# Patient Record
Sex: MALE | Race: WHITE | Employment: OTHER | URBAN - NONMETROPOLITAN AREA
[De-identification: names, ages, dates, MRNs, and addresses within clinical notes are randomized per-mention and may not be internally consistent; named-entity substitution may affect disease eponyms.]

---

## 2022-01-11 ENCOUNTER — DOCUMENTATION (OUTPATIENT)
Dept: NEUROLOGY | Facility: CLINIC | Age: 52
End: 2022-01-11

## 2022-01-11 ENCOUNTER — OFFICE VISIT (OUTPATIENT)
Dept: NEUROLOGY | Facility: CLINIC | Age: 52
End: 2022-01-11

## 2022-01-11 VITALS
DIASTOLIC BLOOD PRESSURE: 76 MMHG | HEART RATE: 100 BPM | SYSTOLIC BLOOD PRESSURE: 124 MMHG | BODY MASS INDEX: 32.95 KG/M2 | WEIGHT: 265 LBS | OXYGEN SATURATION: 97 % | HEIGHT: 75 IN

## 2022-01-11 DIAGNOSIS — R25.3 BENIGN FASCICULATION-CRAMP SYNDROME: Primary | ICD-10-CM

## 2022-01-11 PROCEDURE — 99204 OFFICE O/P NEW MOD 45 MIN: CPT | Performed by: PSYCHIATRY & NEUROLOGY

## 2022-01-11 RX ORDER — PRAZOSIN HYDROCHLORIDE 2 MG/1
2 CAPSULE ORAL NIGHTLY
COMMUNITY

## 2022-01-11 RX ORDER — CYCLOBENZAPRINE HCL 10 MG
10 TABLET ORAL 3 TIMES DAILY PRN
Qty: 90 TABLET | Refills: 1 | Status: SHIPPED | OUTPATIENT
Start: 2022-01-11 | End: 2022-03-29 | Stop reason: SDUPTHER

## 2022-01-11 RX ORDER — MELOXICAM 15 MG/1
15 TABLET ORAL DAILY
COMMUNITY

## 2022-01-11 RX ORDER — CLONAZEPAM 1 MG/1
1 TABLET ORAL NIGHTLY PRN
COMMUNITY
End: 2022-01-11 | Stop reason: SDUPTHER

## 2022-01-11 RX ORDER — CHOLECALCIFEROL (VITAMIN D3) 125 MCG
5 CAPSULE ORAL NIGHTLY PRN
COMMUNITY

## 2022-01-11 RX ORDER — LIDOCAINE 50 MG/G
1 PATCH TOPICAL EVERY 24 HOURS
COMMUNITY

## 2022-01-11 RX ORDER — TOPIRAMATE 50 MG/1
50 TABLET, FILM COATED ORAL 2 TIMES DAILY
COMMUNITY
End: 2022-01-11 | Stop reason: SDUPTHER

## 2022-01-11 RX ORDER — CYCLOBENZAPRINE HCL 10 MG
10 TABLET ORAL 3 TIMES DAILY PRN
COMMUNITY
End: 2022-01-11 | Stop reason: SDUPTHER

## 2022-01-11 RX ORDER — DULOXETIN HYDROCHLORIDE 60 MG/1
60 CAPSULE, DELAYED RELEASE ORAL DAILY
COMMUNITY

## 2022-01-11 RX ORDER — CLONAZEPAM 1 MG/1
1 TABLET ORAL NIGHTLY PRN
Qty: 30 TABLET | Refills: 1 | Status: SHIPPED | OUTPATIENT
Start: 2022-01-11 | End: 2022-03-29 | Stop reason: SDUPTHER

## 2022-01-11 RX ORDER — TRAZODONE HYDROCHLORIDE 100 MG/1
100 TABLET ORAL NIGHTLY
COMMUNITY

## 2022-01-11 RX ORDER — TOPIRAMATE 50 MG/1
50 TABLET, FILM COATED ORAL 2 TIMES DAILY
Qty: 60 TABLET | Refills: 5 | Status: SHIPPED | OUTPATIENT
Start: 2022-01-11 | End: 2022-08-30 | Stop reason: SDUPTHER

## 2022-01-11 NOTE — PROGRESS NOTES
Chief Complaint  Neurologic Problem (pt complains of painful muscle jerking/spasms and muscle cramps. pt recently moved her from [place]. pt also complains of RLS. 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  Patient recently moved from [place] and needs to establish care.  He was seeing a neurologist Dr. Green at the [facility] (formerly [facility]3].  He says he has been diagnosed with benign fasciculation syndrome and had been treated with topiramate, clonazepam, cyclobenzaprine.  He was also getting medical marijuana.  He states this all started after [place].  This caused problems subsequently with his hip and knee on the right.  He was in a lot of pain.  He had previously been on clonazepam and when taken off, he felt like he had no control over his legs likely felt restless.  He describes them as feeling hollow and like they would balance.  He subsequently was having spasms or jerks of his body.  He started seeing Dr. Green who prescribed these medications and significantly decreased these spasms.  I am not sure if he ever had an EMG or what work-up he had with Dr. Green.    Still has chronic pain and has been taken off pain medications.  He was previously seeing an interventional pain doctor who had done injections.        Past Medical History:   Diagnosis Date   • Fatty liver    • PTSD (post-traumatic stress disorder)           Current Outpatient Medications:   •  clonazePAM (KlonoPIN) 1 MG tablet, Take 1 tablet by mouth At Night As Needed for Anxiety., Disp: 30 tablet, Rfl: 1  •  cyclobenzaprine (FLEXERIL) 10 MG tablet, Take 1 tablet by mouth 3 (Three) Times a Day As Needed for Muscle Spasms., Disp: 90 tablet, Rfl: 1  •  DULoxetine (CYMBALTA) 60 MG capsule, Take 60 mg by mouth Daily., Disp: , Rfl:   •  lidocaine (LIDODERM) 5 %,  "Place 1 patch on the skin as directed by provider Daily. Remove & Discard patch within 12 hours or as directed by MD, Disp: , Rfl:   •  magnesium oxide (MAGOX) 400 (241.3 Mg) MG tablet tablet, Take 400 mg by mouth 2 (Two) Times a Day., Disp: , Rfl:   •  melatonin 5 MG tablet tablet, Take 5 mg by mouth At Night As Needed., Disp: , Rfl:   •  meloxicam (MOBIC) 15 MG tablet, Take 15 mg by mouth Daily., Disp: , Rfl:   •  prazosin (MINIPRESS) 2 MG capsule, Take 2 mg by mouth Every Night., Disp: , Rfl:   •  topiramate (TOPAMAX) 50 MG tablet, Take 1 tablet by mouth 2 (Two) Times a Day., Disp: 60 tablet, Rfl: 5  •  traZODone (DESYREL) 100 MG tablet, Take 100 mg by mouth Every Night., Disp: , Rfl:        Objective   Vital Signs:   /76 (BP Location: Left arm, Patient Position: Sitting, Cuff Size: Large Adult)   Pulse 100   Ht 190.5 cm (75\")   Wt 120 kg (265 lb)   SpO2 97%   BMI 33.12 kg/m²     Physical Exam  Constitutional:       General: He is awake.   Eyes:      Extraocular Movements: Extraocular movements intact.      Pupils: Pupils are equal, round, and reactive to light.   Neurological:      Mental Status: He is alert.      Deep Tendon Reflexes: Strength normal and reflexes are normal and symmetric.   Psychiatric:         Speech: Speech normal.        Neurological Exam  Mental Status  Awake and alert. Oriented to person, place and time. Recent and remote memory are intact. Speech is normal. Language is fluent with no aphasia. Attention and concentration are normal. Fund of knowledge is appropriate for level of education.    Cranial Nerves  CN II: Visual fields full to confrontation.  CN III, IV, VI: Extraocular movements intact bilaterally. Pupils equal round and reactive to light bilaterally.  CN V: Facial sensation is normal.  CN VII: Full and symmetric facial movement.  CN IX, X: Palate elevates symmetrically  CN XI: Shoulder shrug strength is normal.  CN XII: Tongue midline without atrophy or " fasciculations.    Motor  Normal muscle bulk throughout. Normal muscle tone. No abnormal involuntary movements. Strength is 5/5 throughout all four extremities.    Sensory  Light touch is normal in upper and lower extremities.     Reflexes  Deep tendon reflexes are 2+ and symmetric in all four extremities with downgoing toes bilaterally.    Coordination  Right: Finger-to-nose normal.    Gait  Casual gait is normal including stance, stride, and arm swing.      Result Review :                     Assessment and Plan   51-year-old male with reported benign cramp fasciculation syndrome.  He notes and treated by Dr. Green in [place].  He states he was doing well on topiramate 50 mg twice daily, clonazepam 1 mg nightly, and Flexeril 10 mg 3 times daily.  His neurologic exam is normal today.  I will get the records from his previous neurologist and will refill his medications at least until I can confirm.    Plan:    1.  Continue medications as above.  2.  Get records from neurologist in [place].  3.  Had patient's sign a controlled substance contract today.        Follow Up   Return in about 6 months (around 7/11/2022) for Next scheduled follow up.  Patient was given instructions and counseling regarding his condition or for health maintenance advice. Please see specific information pulled into the AVS if appropriate.

## 2022-01-11 NOTE — PROGRESS NOTES
When I created the referral I entered it into the wrong chart. It should have been entered into the Father's chart. I have entered for a chart correction and entered a SAFE report. The patient was seen after a correct chart was created.

## 2022-03-29 DIAGNOSIS — R25.3 BENIGN FASCICULATION-CRAMP SYNDROME: ICD-10-CM

## 2022-03-29 RX ORDER — CYCLOBENZAPRINE HCL 10 MG
10 TABLET ORAL 3 TIMES DAILY PRN
Qty: 90 TABLET | Refills: 1 | Status: SHIPPED | OUTPATIENT
Start: 2022-03-29 | End: 2022-06-01

## 2022-03-29 RX ORDER — CLONAZEPAM 1 MG/1
1 TABLET ORAL NIGHTLY PRN
Qty: 30 TABLET | Refills: 1 | Status: SHIPPED | OUTPATIENT
Start: 2022-03-29 | End: 2022-06-17 | Stop reason: SDUPTHER

## 2022-03-29 NOTE — TELEPHONE ENCOUNTER
PT CALLED BACK AND REMEMBERED HE ALSO NEEDED HIS    CYCLOBENZAPRINE  10MG ADDED TO THAT RX ORDER     [facility]  [phone]

## 2022-03-29 NOTE — TELEPHONE ENCOUNTER
Caller: Priti Perfecto    Relationship: Self    Best call back number: [phone]    Requested Prescriptions:   Requested Prescriptions     Pending Prescriptions Disp Refills   • clonazePAM (KlonoPIN) 1 MG tablet 30 tablet 1     Sig: Take 1 tablet by mouth At Night As Needed for Anxiety.        [facility] sent[facility]     Additional details provided by patient: PT STATES HE HAS 7 DOSES LEFT    Does the patient have less than a 3 day supply:  [] Yes  [x] No    Lionel Davila Rep   03/29/22 11:23 CDT

## 2022-06-01 DIAGNOSIS — R25.3 BENIGN FASCICULATION-CRAMP SYNDROME: ICD-10-CM

## 2022-06-01 RX ORDER — CYCLOBENZAPRINE HCL 10 MG
TABLET ORAL
Qty: 90 TABLET | Refills: 1 | Status: SHIPPED | OUTPATIENT
Start: 2022-06-01 | End: 2022-08-30 | Stop reason: SDUPTHER

## 2022-06-16 NOTE — TELEPHONE ENCOUNTER
[facility] STATES [facility]? CAN WE FAX ANOTHER  SCRIPT OVER.     PLEASE ADVISE.     [phone] FOLLOW PROMP TO PHARMACY .

## 2022-06-17 DIAGNOSIS — R25.3 BENIGN FASCICULATION-CRAMP SYNDROME: ICD-10-CM

## 2022-06-17 RX ORDER — CLONAZEPAM 1 MG/1
1 TABLET ORAL NIGHTLY PRN
Qty: 30 TABLET | Refills: 1 | Status: SHIPPED | OUTPATIENT
Start: 2022-06-17 | End: 2022-07-18 | Stop reason: SDUPTHER

## 2022-06-17 NOTE — TELEPHONE ENCOUNTER
Contacted Perfecto yesterday, he requested Klonopin refill.  Explained that Dr. Perry was out of the office yesterday.  Notified she sent a script to his pharmacy today.

## 2022-07-18 DIAGNOSIS — R25.3 BENIGN FASCICULATION-CRAMP SYNDROME: ICD-10-CM

## 2022-07-18 RX ORDER — CLONAZEPAM 1 MG/1
1 TABLET ORAL NIGHTLY PRN
Qty: 30 TABLET | Refills: 1 | Status: SHIPPED | OUTPATIENT
Start: 2022-07-18 | End: 2022-08-30 | Stop reason: SDUPTHER

## 2022-07-18 NOTE — TELEPHONE ENCOUNTER
Caller: Perfecto Marcos    Relationship: Self    Best call back number: [phone]    Requested Prescriptions:   Requested Prescriptions     Pending Prescriptions Disp Refills   • clonazePAM (KlonoPIN) 1 MG tablet 30 tablet 1     Sig: Take 1 tablet by mouth At Night As Needed for Anxiety.      [facility]    Additional details provided by patient:   PT ONLY 5 DAYS LEFT. PT STATED LAST MONTH THIS RX WAS FILLED 21 DAYS LATE. PT IS WANTING TO AVOID A REPEAT OF THIS.  THE OTHER RXs PRESCRIBED BY DR MABRY CAME AS EXPECTED. IT WAS ONLY THE CLONAZEPAM THAT WAS LATE.    Does the patient have less than a 3 day supply:  [] Yes  [x] No    Lionel Fleming Rep   07/18/22 11:12 CDT

## 2022-08-30 ENCOUNTER — OFFICE VISIT (OUTPATIENT)
Dept: NEUROLOGY | Facility: CLINIC | Age: 52
End: 2022-08-30

## 2022-08-30 VITALS
SYSTOLIC BLOOD PRESSURE: 102 MMHG | OXYGEN SATURATION: 97 % | HEIGHT: 75 IN | HEART RATE: 82 BPM | DIASTOLIC BLOOD PRESSURE: 70 MMHG | WEIGHT: 248.6 LBS | BODY MASS INDEX: 30.91 KG/M2

## 2022-08-30 DIAGNOSIS — R25.3 BENIGN FASCICULATION-CRAMP SYNDROME: ICD-10-CM

## 2022-08-30 PROCEDURE — 99214 OFFICE O/P EST MOD 30 MIN: CPT | Performed by: PSYCHIATRY & NEUROLOGY

## 2022-08-30 RX ORDER — TOPIRAMATE 50 MG/1
50 TABLET, FILM COATED ORAL 2 TIMES DAILY
Qty: 60 TABLET | Refills: 5 | Status: SHIPPED | OUTPATIENT
Start: 2022-08-30 | End: 2023-02-06 | Stop reason: SDUPTHER

## 2022-08-30 RX ORDER — CYCLOBENZAPRINE HCL 10 MG
10 TABLET ORAL 3 TIMES DAILY PRN
Qty: 90 TABLET | Refills: 5 | Status: SHIPPED | OUTPATIENT
Start: 2022-08-30

## 2022-08-30 RX ORDER — CLONAZEPAM 1 MG/1
1 TABLET ORAL NIGHTLY PRN
Qty: 30 TABLET | Refills: 5 | Status: SHIPPED | OUTPATIENT
Start: 2022-08-30 | End: 2023-03-07 | Stop reason: SDUPTHER

## 2023-02-06 DIAGNOSIS — R25.3 BENIGN FASCICULATION-CRAMP SYNDROME: ICD-10-CM

## 2023-02-06 RX ORDER — CLONAZEPAM 1 MG/1
1 TABLET ORAL NIGHTLY PRN
Qty: 30 TABLET | Refills: 5 | Status: CANCELLED | OUTPATIENT
Start: 2023-02-06 | End: 2023-08-05

## 2023-02-06 NOTE — TELEPHONE ENCOUNTER
Caller: Perfecto Marcos    Relationship: Self    Best call back number: [phone]      Requested Prescriptions:   Requested Prescriptions     Pending Prescriptions Disp Refills   • clonazePAM (KlonoPIN) 1 MG tablet 30 tablet 5     Sig: Take 1 tablet by mouth At Night As Needed for Anxiety for up to 180 days.   • topiramate (TOPAMAX) 50 MG tablet 60 tablet 5     Sig: Take 1 tablet by mouth 2 (Two) Times a Day.        Pharmacy where request should be sent: [facility] - [place], [place] - [address] [place] [address] - [phone] [facility] [phone] FX     Additional details provided by patient: PATIENT STATES THAT HE HAS A WEEK'S WORTH LEFT BUT IT IS A MAIL ORDER PHARMACY SO IT NEEDS TO BE SUBMITTED RIGHT AWAY.    Does the patient have less than a 3 day supply:  [x] Yes  [] No    Would you like a call back once the refill request has been completed: [x] Yes [] No    If the office needs to give you a call back, can they leave a voicemail: [x] Yes [] No    Lionel Herman Rep   02/06/23 14:32 CST

## 2023-02-06 NOTE — TELEPHONE ENCOUNTER
Explained that [facility] and the pharmacy said the script was filled on 1-11 and 1-26.  He found the package that was sent on 1-26 and apologized, he said they have had the flu.

## 2023-02-07 RX ORDER — TOPIRAMATE 50 MG/1
50 TABLET, FILM COATED ORAL 2 TIMES DAILY
Qty: 60 TABLET | Refills: 0 | Status: SHIPPED | OUTPATIENT
Start: 2023-02-07

## 2023-02-28 ENCOUNTER — OFFICE VISIT (OUTPATIENT)
Dept: NEUROLOGY | Facility: CLINIC | Age: 53
End: 2023-02-28
Payer: OTHER GOVERNMENT

## 2023-02-28 VITALS
BODY MASS INDEX: 30.46 KG/M2 | SYSTOLIC BLOOD PRESSURE: 122 MMHG | HEIGHT: 75 IN | DIASTOLIC BLOOD PRESSURE: 84 MMHG | HEART RATE: 85 BPM | WEIGHT: 245 LBS | OXYGEN SATURATION: 98 %

## 2023-02-28 DIAGNOSIS — R25.3 BENIGN FASCICULATION-CRAMP SYNDROME: Primary | ICD-10-CM

## 2023-02-28 PROCEDURE — 99214 OFFICE O/P EST MOD 30 MIN: CPT | Performed by: PSYCHIATRY & NEUROLOGY

## 2023-03-07 DIAGNOSIS — R25.3 BENIGN FASCICULATION-CRAMP SYNDROME: ICD-10-CM

## 2023-03-07 NOTE — TELEPHONE ENCOUNTER
Caller: Perfecto Marcos    Relationship: Self    Best call back number: [phone]    Requested Prescriptions:   Requested Prescriptions     Pending Prescriptions Disp Refills   • clonazePAM (KlonoPIN) 1 MG tablet 30 tablet 5     Sig: Take 1 tablet by mouth At Night As Needed for Anxiety for up to 180 days.        Pharmacy where request should be sent: [facility] - [address] [phone]  - [phone] FX     Additional details provided by patient: PATIENT HAS 4 DAYS LEFT.    Does the patient have less than a 3 day supply:  [] Yes  [x] No    Would you like a call back once the refill request has been completed: [] Yes [x] No    If the office needs to give you a call back, can they leave a voicemail: [] Yes [x] No    Lionel Ray Rep   03/07/23 16:10 CST

## 2023-03-08 RX ORDER — CLONAZEPAM 1 MG/1
1 TABLET ORAL NIGHTLY PRN
Qty: 30 TABLET | Refills: 1 | Status: SHIPPED | OUTPATIENT
Start: 2023-03-08

## 2023-04-04 DIAGNOSIS — R25.3 BENIGN FASCICULATION-CRAMP SYNDROME: ICD-10-CM

## 2023-04-04 NOTE — TELEPHONE ENCOUNTER
Explained that Dr. Perry would like his PCP to prescribe his medications.  I have [facility]ed [facility] notes and [facility] [place].

## 2023-04-04 NOTE — TELEPHONE ENCOUNTER
Caller: Perfecto Marcos    Relationship: Self    Best call back number: [phone]    Requested Prescriptions:   Requested Prescriptions     Pending Prescriptions Disp Refills   • topiramate (TOPAMAX) 50 MG tablet 60 tablet 0     Sig: Take 1 tablet by mouth 2 (Two) Times a Day.        Pharmacy where request should be sent:   [facility]     Last office visit with prescribing clinician: 2/28/2023   Last telemedicine visit with prescribing clinician: Visit date not found   Next office visit with prescribing clinician: Visit date not found     Additional details provided by patient:     Does the patient have less than a 3 day supply:  [] Yes  [x] No    Would you like a call back once the refill request has been completed: [] Yes [] No    If the office needs to give you a call back, can they leave a voicemail: [] Yes [] No    Lionel Fleming Rep   04/04/23 13:47 CDT

## 2023-04-06 RX ORDER — TOPIRAMATE 50 MG/1
50 TABLET, FILM COATED ORAL 2 TIMES DAILY
Qty: 60 TABLET | Refills: 0 | OUTPATIENT
Start: 2023-04-06